# Patient Record
Sex: FEMALE | Race: BLACK OR AFRICAN AMERICAN | NOT HISPANIC OR LATINO | Employment: UNEMPLOYED | URBAN - METROPOLITAN AREA
[De-identification: names, ages, dates, MRNs, and addresses within clinical notes are randomized per-mention and may not be internally consistent; named-entity substitution may affect disease eponyms.]

---

## 2024-01-04 DIAGNOSIS — M67.371 TRANSIENT SYNOVITIS, RIGHT ANKLE AND FOOT: ICD-10-CM

## 2024-01-04 DIAGNOSIS — M79.671 RIGHT FOOT PAIN: Primary | ICD-10-CM

## 2024-01-11 ENCOUNTER — HOSPITAL ENCOUNTER (OUTPATIENT)
Dept: RADIOLOGY | Facility: HOSPITAL | Age: 39
Discharge: HOME | End: 2024-01-11
Attending: PODIATRIST
Payer: MEDICAID

## 2024-01-11 DIAGNOSIS — M67.371 TRANSIENT SYNOVITIS, RIGHT ANKLE AND FOOT: ICD-10-CM

## 2024-01-11 DIAGNOSIS — M79.671 RIGHT FOOT PAIN: ICD-10-CM

## 2024-01-11 PROCEDURE — 73718 MRI LOWER EXTREMITY W/O DYE: CPT | Mod: TC,RT

## 2024-03-01 DIAGNOSIS — R76.8 ANA POSITIVE: Primary | ICD-10-CM

## 2024-03-05 DIAGNOSIS — R76.8 POSITIVE ANA (ANTINUCLEAR ANTIBODY): Primary | ICD-10-CM

## 2025-05-21 ENCOUNTER — OFFICE VISIT (OUTPATIENT)
Dept: RHEUMATOLOGY | Facility: CLINIC | Age: 40
End: 2025-05-21
Payer: MEDICAID

## 2025-05-21 VITALS
HEART RATE: 90 BPM | RESPIRATION RATE: 16 BRPM | OXYGEN SATURATION: 94 % | SYSTOLIC BLOOD PRESSURE: 113 MMHG | TEMPERATURE: 98 F | WEIGHT: 164.81 LBS | DIASTOLIC BLOOD PRESSURE: 80 MMHG | HEIGHT: 57 IN | BODY MASS INDEX: 35.56 KG/M2

## 2025-05-21 DIAGNOSIS — M79.671 RIGHT FOOT PAIN: ICD-10-CM

## 2025-05-21 DIAGNOSIS — R76.8 POSITIVE ANA (ANTINUCLEAR ANTIBODY): Primary | ICD-10-CM

## 2025-05-21 DIAGNOSIS — M77.41 METATARSALGIA OF RIGHT FOOT: ICD-10-CM

## 2025-05-21 PROCEDURE — 99213 OFFICE O/P EST LOW 20 MIN: CPT | Mod: PBBFAC | Performed by: INTERNAL MEDICINE

## 2025-05-21 PROCEDURE — 3008F BODY MASS INDEX DOCD: CPT | Mod: CPTII,,, | Performed by: INTERNAL MEDICINE

## 2025-05-21 PROCEDURE — 3074F SYST BP LT 130 MM HG: CPT | Mod: CPTII,,, | Performed by: INTERNAL MEDICINE

## 2025-05-21 PROCEDURE — 99204 OFFICE O/P NEW MOD 45 MIN: CPT | Mod: S$PBB,,, | Performed by: INTERNAL MEDICINE

## 2025-05-21 PROCEDURE — 1159F MED LIST DOCD IN RCRD: CPT | Mod: CPTII,,, | Performed by: INTERNAL MEDICINE

## 2025-05-21 PROCEDURE — 3079F DIAST BP 80-89 MM HG: CPT | Mod: CPTII,,, | Performed by: INTERNAL MEDICINE

## 2025-05-21 RX ORDER — GUANFACINE 1 MG/1
1 TABLET ORAL
COMMUNITY
Start: 2025-04-29

## 2025-05-21 RX ORDER — MEDROXYPROGESTERONE ACETATE 150 MG/ML
INJECTION, SUSPENSION INTRAMUSCULAR
COMMUNITY
Start: 2025-03-28

## 2025-05-21 RX ORDER — TRAZODONE HYDROCHLORIDE 50 MG/1
TABLET ORAL
COMMUNITY
Start: 2025-05-21

## 2025-05-21 RX ORDER — BUPROPION HYDROCHLORIDE 300 MG/1
TABLET ORAL
COMMUNITY
Start: 2024-09-21

## 2025-05-21 RX ORDER — BUSPIRONE HYDROCHLORIDE 5 MG/1
5 TABLET ORAL 2 TIMES DAILY PRN
COMMUNITY
Start: 2025-04-29

## 2025-05-21 NOTE — PROGRESS NOTES
Patient ID: [id number]     Chief Complaint: Positive LOU (PT states that her foot is bothering her. She states she's having pain when she's walking or sitting. States its a sharp in the bottom part of her right foot. )      Referred By: No ref. provider found     HPI:     Faith Steve is a 39 y.o. female here today for a new patient visit.      She had seen Dr Roberts, rheumatologist at [facility] in the past.  She was diagnosed with early osteoarthritis.    Complaining of pain in right foot in between base of 2nd, 3rd and 4th MTPs.  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  Pain worse with prolonged standing, better if she keeps her foot elevated.  Denies redness or swelling of foot.  No pain in heel or in back of food.  No pain in any other joints.  Denies red, warm and swollen joints.  No morning stiffness.      Denies history of fevers, rashes, photosensitivity, oral or nasal ulcers, h/o MI, stroke, seizures, h/o PE or DVT, Raynaud's phenomenon, uveitis, malignancies.   Family history of autoimmune disease: none  Pregnancies: 4  Miscarriages: none  Smoking: nonsmoker    Tobacco Use History[1]       No past medical history on file.     Past Surgical History:   Procedure Laterality Date     SECTION  2019    10/28/2011, 2006, 2003       Review of patient's allergies indicates:  No Known Allergies    Outpatient Medications Marked as Taking for the 25 encounter (Office Visit) with Gibson Redding MD   Medication Sig Dispense Refill    buPROPion (WELLBUTRIN XL) 300 MG 24 hr tablet       busPIRone (BUSPAR) 5 MG Tab Take 5 mg by mouth 2 (two) times daily as needed.      guanFACINE (TENEX) 1 MG Tab Take 1 mg by mouth.      medroxyPROGESTERone (DEPO-PROVERA) 150 mg/mL injection SMARTSI Milliliter(s) IM Every 3 Weeks      traZODone (DESYREL) 50 MG tablet          Social History[2]     Family History   Problem Relation Name Age of Onset    Alcohol abuse Mother  "Lazara Yancey     Cancer Mother Lazara Yancey     Cancer Maternal Grandmother Carmita Cole     Hearing loss Maternal Grandmother Carmita Cole     Stroke Maternal Grandmother Carmita Cole     Asthma Son Yael Calvillo     Asthma Son Herb Bobby     Asthma Son Soham Bobby     Learning disabilities Son Soham Bobby     Drug abuse Sister Pia Cole     Early death Sister Pia Cole           There is no immunization history on file for this patient.    Patient Care Team:  No, Primary Doctor as PCP - General     Subjective:     Constitutional:  Denies chills. Denies fever. Denies night sweats. Denies weight loss.   Ophthalmology: Denies blurred vision. Denies dry eyes. Denies eye pain. Denies Itching and redness.   ENT: Denies oral ulcers. Denies epistaxis. Denies dry mouth. Denies swollen glands.   Endocrine: Denies diabetes. Denies thyroid Problems.   Respiratory: Denies cough. Denies shortness of breath. Denies shortness of breath with exertion. Denies hemoptysis.   Cardiovascular: Denies chest pain at rest. Denies chest pain with exertion. Denies palpitations.    Gastrointestinal: Denies abdominal pain. Denies diarrhea. Denies nausea. Denies vomiting. Denies hematemesis or hematochezia. Denies heartburn.  Genitourinary: Denies blood in urine.  Musculoskeletal: See HPI for details  Integumentary: Denies rash. Denies photosensitivity.   Peripheral Vascular: Denies Ulcers of hands and/or feet. Denies Cold extremities.   Neurologic: Denies dizziness. Denies headache.  Denies loss of strength. Denies numbness or tingling.   Psychiatric: Denies depression. Denies anxiety. Denies suicidal/homicidal ideations.      Objective:     /80 (BP Location: Right arm, Patient Position: Sitting)   Pulse 90   Temp 98.3 °F (36.8 °C) (Oral)   Resp 16   Ht 4' 9" (1.448 m)   Wt 74.8 kg (164 lb 12.8 oz)   LMP 10/01/2024 (Within Months) Comment: Depo Shots  SpO2 (!) 94%   BMI 35.66 kg/m²     Physical " Exam    General Appearance: alert, pleasant, in no acute distress.  Skin: Skin color, texture, turgor normal. No rashes or lesions.  Eyes:  extraocular movement intact (EOMI), pupils equal, round, reactive to light and accommodation, conjunctiva clear.  ENT: No oral or nasal ulcers.  Neck:  Neck supple. No adenopathy.   Lungs: CTA throughout without crackles, rhonchi, or wheezes.   Heart: RRR w/o murmurs.  No edema. 2+ DP pulse.  Abdomen: Soft, non-tender, no masses, rebound or guarding.  Neuro: Alert, oriented, CN II-XII GI, sensory and motor innervation intact.  Musculoskeletal:  Severe tenderness with palpation of forefoot at the area of 2nd, 3rd and 4th MTP, in between MTPs on right side.  Rest of the joint exam is normal.  Psych: Alert, oriented, normal eye contact.        Labs:     2024: CMP ok.  Uric acid normal.  CBC okay.  No protein and blood in urine. RF negative.   3/2024: Positive LOU by PETRONA and SSA low titer 1.2.  Negative dsDNA, Steve, RNP, chromatin, SSB, Scl 70, Niharika 1.  2024: C3 normal.  C4 slightly elevated.  Rheumatoid factor negative.  CRP and ESR normal.  LOU negative by IFA.  CCP negative.    Imagin/28/24:  X-ray of cervical and thoracic spine did not show evidence of fracture or subluxation no significant DJD changes seen.  Normal x-ray of bilateral hands.  Normal x-ray of bilateral knees.  Normal x-ray of lumbar spine.  Normal x-ray of SI joints.  Normal x-ray of bilateral feet.    2024:  MRI of right foot was with in normal limits. The base of the 2nd metatarsal is well aligned with the middle cuneiform.  No osteochondral defects seen to the talar dome.  Bone marrow signal is unremarkable.  No ankle or subtalar joint effusion.  The metatarsophalangeal joints are preserved without erosion or effusion.  The plantar plates are well visualized and appear intact.  The syndesmosis is preserved.  Talofibular ligaments are intact.  The deltoid ligament complex is intact.  Spring  ligament is intact.  Lisfranc ligament is intact.  Short plantar ligament is intact.  The Achilles tendon is unremarkable.  The anterior and posterior tendons are normal in course and caliber.  The peroneal tendons are normal in course and caliber.  Sinus tarsi is unremarkable.  The included portion of the plantar fascia is intact.  Intrinsic muscles are unremarkable.  No intermetatarsal bursitis or Leo's neuroma.    Assessment:       ICD-10-CM ICD-9-CM   1. Positive LOU (antinuclear antibody)  R76.8 795.79   2. Right foot pain  M79.671 729.5   3. Metatarsalgia of right foot  M77.41 726.70        Plan:     1. Positive LOU (antinuclear antibody):  History of positive LOU, repeat LOU by IFA negative.  Currently does not have any signs or symptoms of lupus or other active autoimmune disease.  No further workup needed.    Discharged from Rheumatology Clinic, she can be re-reffered for any new issues in the future.       2. Right foot pain cause of metatarsalgia.  MRI did not show Leo's neuroma.  Continue with symptomatic management and advised to follow-up with Orthopedics.  Patient verbalized understanding.             Total time spent with patient and documentation is 50 minutes. All questions were answered to patient's satisfaction and patient verbalized understanding.                [1]   Social History  Tobacco Use   Smoking Status Never   Smokeless Tobacco Never   [2]   Social History  Socioeconomic History    Marital status: Single   Tobacco Use    Smoking status: Never    Smokeless tobacco: Never   Substance and Sexual Activity    Alcohol use: Not Currently     Comment: Socially not often    Drug use: Never    Sexual activity: Yes     Partners: Male     Birth control/protection: Injection     Social Drivers of Health     Food Insecurity: High Risk (4/1/2025)    Received from [facility] SDOH Screening     In the last 12 months, did you ever eat less than you felt you should because there was  not enough money for food?: Yes   Housing Stability: High Risk (4/1/2025)    Received from [facility] SDOH Screening     In the past 12 months has the electric, gas, oil, or water company threatened to shut off services in your home?: Yes     What is your living situation today?: I have a place to live today, but i am worried about losing it in the future